# Patient Record
Sex: MALE | Race: WHITE
[De-identification: names, ages, dates, MRNs, and addresses within clinical notes are randomized per-mention and may not be internally consistent; named-entity substitution may affect disease eponyms.]

---

## 2017-05-14 ENCOUNTER — HOSPITAL ENCOUNTER (INPATIENT)
Dept: HOSPITAL 14 - H.ER | Age: 42
LOS: 2 days | Discharge: HOME | DRG: 918 | End: 2017-05-16
Attending: INTERNAL MEDICINE | Admitting: INTERNAL MEDICINE
Payer: MEDICAID

## 2017-05-14 VITALS — BODY MASS INDEX: 27.8 KG/M2

## 2017-05-14 DIAGNOSIS — K92.0: ICD-10-CM

## 2017-05-14 DIAGNOSIS — Z59.0: ICD-10-CM

## 2017-05-14 DIAGNOSIS — G62.9: ICD-10-CM
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
## 2017-05-14 DIAGNOSIS — F10.10: ICD-10-CM

## 2017-05-14 DIAGNOSIS — D72.828: ICD-10-CM

## 2017-05-14 DIAGNOSIS — K29.50: ICD-10-CM

## 2017-05-14 DIAGNOSIS — M54.5: ICD-10-CM

## 2017-05-14 DIAGNOSIS — F11.10: ICD-10-CM

## 2017-05-14 DIAGNOSIS — F32.9: ICD-10-CM

## 2017-05-14 DIAGNOSIS — F17.210: ICD-10-CM

## 2017-05-14 DIAGNOSIS — F12.90: ICD-10-CM

## 2017-05-14 DIAGNOSIS — T43.211A: Primary | ICD-10-CM

## 2017-05-14 DIAGNOSIS — F41.9: ICD-10-CM

## 2017-05-14 DIAGNOSIS — F14.10: ICD-10-CM

## 2017-05-14 DIAGNOSIS — K20.9: ICD-10-CM

## 2017-05-14 DIAGNOSIS — F16.10: ICD-10-CM

## 2017-05-14 DIAGNOSIS — G89.29: ICD-10-CM

## 2017-05-14 LAB
ALBUMIN/GLOB SERPL: 1.3 {RATIO} (ref 1–2.1)
ALP SERPL-CCNC: 87 U/L (ref 38–126)
ALT SERPL-CCNC: 58 U/L (ref 21–72)
AST SERPL-CCNC: 85 U/L (ref 17–59)
BASOPHILS # BLD AUTO: 0.1 K/UL (ref 0–0.2)
BASOPHILS NFR BLD: 1 % (ref 0–2)
BILIRUB SERPL-MCNC: 0.6 MG/DL (ref 0.2–1.3)
BUN SERPL-MCNC: 17 MG/DL (ref 9–20)
CALCIUM SERPL-MCNC: 9 MG/DL (ref 8.4–10.2)
CHLORIDE SERPL-SCNC: 105 MMOL/L (ref 98–107)
CO2 SERPL-SCNC: 25 MMOL/L (ref 22–30)
EOSINOPHIL # BLD AUTO: 0.1 K/UL (ref 0–0.7)
EOSINOPHIL NFR BLD: 1.1 % (ref 0–4)
ERYTHROCYTE [DISTWIDTH] IN BLOOD BY AUTOMATED COUNT: 14.2 % (ref 11.5–14.5)
ETHANOL SERPL-MCNC: < 10 MG/DL (ref 0–10)
GLOBULIN SER-MCNC: 3.3 GM/DL (ref 2.2–3.9)
GLUCOSE SERPL-MCNC: 83 MG/DL (ref 75–110)
HCT VFR BLD CALC: 40.3 % (ref 35–51)
LYMPHOCYTES # BLD AUTO: 3.5 K/UL (ref 1–4.3)
LYMPHOCYTES NFR BLD AUTO: 30.5 % (ref 20–40)
MAGNESIUM SERPL-MCNC: 2.1 MG/DL (ref 1.6–2.3)
MCH RBC QN AUTO: 28.1 PG (ref 27–31)
MCHC RBC AUTO-ENTMCNC: 32.6 G/DL (ref 33–37)
MCV RBC AUTO: 86.2 FL (ref 80–94)
MONOCYTES # BLD: 0.8 K/UL (ref 0–0.8)
MONOCYTES NFR BLD: 7.1 % (ref 0–10)
NEUTROPHILS # BLD: 6.9 K/UL (ref 1.8–7)
NEUTROPHILS NFR BLD AUTO: 60.3 % (ref 50–75)
NRBC BLD AUTO-RTO: 0.1 % (ref 0–0)
PLATELET # BLD: 352 K/UL (ref 130–400)
PMV BLD AUTO: 8.3 FL (ref 7.2–11.7)
POTASSIUM SERPL-SCNC: 4 MMOL/L (ref 3.6–5)
PROT SERPL-MCNC: 7.7 G/DL (ref 6.3–8.2)
SODIUM SERPL-SCNC: 140 MMOL/L (ref 132–148)
WBC # BLD AUTO: 11.4 K/UL (ref 4.8–10.8)

## 2017-05-14 SDOH — ECONOMIC STABILITY - HOUSING INSECURITY: HOMELESSNESS: Z59.0
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
## 2017-05-14 NOTE — ED PDOC
- Laboratory Results


Result Diagrams: 


 05/14/17 19:05





 05/14/17 19:05





- ECG


O2 Sat by Pulse Oximetry: 100





- Progress


ED Course And Treament: 





Signed out to me pending re-evaluation and sobriety.


CT head w/o contrast: negative.


CT cervical spine w/o contrast: no fx.


Pt. sleeping comfortably and easily arousable to tactile stimulation.





0015


Pt. woke up and began having bright red vomitus. 


Protonix 80mg IV, Zofran 4mg IV, IV NS bolus, labs ordered. EKG ordered.


Pt. reports no hx of GI bleed. Last BM was yesterday and was normal. Pt. admits 

to drinking alcohol daily. 


Denies melena, hematochezia, BRBPR. RECTAL: hemoccult negative


EKG: Sinus tach @ 102 bpm without ST-T wave changes, no QRS prolongation.  


Case d/w Dr. Lino who agrees with care. 


Case d/w Dr. Jasmine and arrangements made for admission. 





Disposition





- Clinical Impression


Clinical Impression: 


 Overdose, Syncope, GI bleed








- POA


Present On Arrival: None





- Disposition


Disposition: Routine/Home


Disposition Time: 01:00


Condition: FAIR

## 2017-05-15 LAB
APTT BLD: 28.7 SECONDS (ref 23.3–32.5)
BILIRUB UR-MCNC: NEGATIVE MG/DL
BUN SERPL-MCNC: 13 MG/DL (ref 9–20)
CALCIUM SERPL-MCNC: 8.1 MG/DL (ref 8.4–10.2)
CHLORIDE SERPL-SCNC: 106 MMOL/L (ref 98–107)
CO2 SERPL-SCNC: 27 MMOL/L (ref 22–30)
COLOR UR: YELLOW
ERYTHROCYTE [DISTWIDTH] IN BLOOD BY AUTOMATED COUNT: 14.5 % (ref 11.5–14.5)
GLUCOSE SERPL-MCNC: 94 MG/DL (ref 75–110)
GLUCOSE UR STRIP-MCNC: (no result) MG/DL
HCT VFR BLD CALC: 37.4 % (ref 35–51)
KETONES UR STRIP-MCNC: 20 MG/DL
LEUKOCYTE ESTERASE UR-ACNC: (no result) LEU/UL
MCH RBC QN AUTO: 27.8 PG (ref 27–31)
MCHC RBC AUTO-ENTMCNC: 32.1 G/DL (ref 33–37)
MCV RBC AUTO: 86.7 FL (ref 80–94)
PH UR STRIP: 6 [PH] (ref 5–8)
PLATELET # BLD: 318 K/UL (ref 130–400)
POTASSIUM SERPL-SCNC: 4 MMOL/L (ref 3.6–5)
PROT UR STRIP-MCNC: NEGATIVE MG/DL
RBC # UR STRIP: (no result) /UL
RBC #/AREA URNS HPF: 3 /HPF (ref 0–3)
SODIUM SERPL-SCNC: 140 MMOL/L (ref 132–148)
SP GR UR STRIP: 1.01 (ref 1–1.03)
UROBILINOGEN UR-MCNC: (no result) MG/DL (ref 0.2–1)
WBC # BLD AUTO: 9.2 K/UL (ref 4.8–10.8)
WBC #/AREA URNS HPF: 2 /HPF (ref 0–5)

## 2017-05-15 PROCEDURE — 0DB58ZX EXCISION OF ESOPHAGUS, VIA NATURAL OR ARTIFICIAL OPENING ENDOSCOPIC, DIAGNOSTIC: ICD-10-PCS | Performed by: INTERNAL MEDICINE

## 2017-05-15 RX ADMIN — BACITRACIN SCH APPLIC: 500 OINTMENT TOPICAL at 18:05

## 2017-05-15 NOTE — CON
DATE: 05/15/2017



REFERRING PHYSICIAN:  Dr. Mariusz Jasmine



REASON FOR CONSULTATION:  Hematemesis.



This is a 41-year-old man with history of mental status changes, is homeless male recently presented 
2 weeks ago, anxiety, depression, back problems, alcohol and drug abuse.  Essentially found unrespons
pi[place] station.  He basically had episode of bright red blood emesis, for which
 GI was called.  The patient is currently lying in bed, comfortable, no apparent distress.



PAST MEDICAL HISTORY:  As above.



PAST SURGICAL HISTORY:  As above.



MEDICATIONS:  Have been reviewed.



All other systems have been reviewed and negative apart from the HPI.



PHYSICAL EXAMINATION:

VITAL SIGNS:  Here in the hospital, _____.

GENERAL:  A pleasant middle-aged man, lying in bed, comfortable, in no apparent distress.

HEAD:  Normocephalic, atraumatic.

EYES:  Pupils equally reactive to light bilaterally.  No conjunctival pallor or icterus.

NECK:  Supple, normal range of motion.  No lymphadenopathy appreciated.

LUNGS:  Coarse breath sounds bilaterally.

HEART:  S1, S2.  Regular rate and rhythm.  No murmurs appreciated.

ABDOMEN:  Soft, nontender.  Bowel sounds present.  No rebound, no guarding.

RECTAL:  Deferred.

EXTREMITIES:  Pulses present bilaterally.

SKIN:  Warm, dry and intact.

NEUROLOGIC:  Alert and oriented x 3.



LABORATORIES:  Have been reviewed.  _____ down to 9.3, hemoglobin was 13.1, now 12.1, platelet count 
is normal.  INR is normal.  AST 85.



ASSESSMENT AND PLAN:  This is a 41-year-old man with hematemesis.  Plan for EGD.



Thank you for the consult.





__________________________________________

Álvaro Lino MD, PhD







cc:Mariusz Jasmine M.D.



DD: 05/15/2017 12:39:18  906

TT: 05/15/2017 14:10:34

Confirmation # [id number]

Dictation # [id number]

en
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
## 2017-05-15 NOTE — CP.PCM.PN
Subjective





- Date & Time of Evaluation


Date of Evaluation: 05/15/17


Time of Evaluation: 14:45





- Subjective


Subjective: 





Hospitalist Progress Note (Patient was seen and examined at 2:45 PM 5/15/17 402-

2)





41 year old male who was admitted earlier this morning after he was found 

unresponsive in the bathroom [place] a train station. UDS showed positive for Opiates

, Phenycyclidine, Benzodiazepine, and Cocaine. He also had one episode of 

bright red blood vomitus while in the ER. He was admitted to the Telemetry Unit 

for further evaluation.





ROS


NO more episodes of blood vomiting


NO other complaints other than swelling and pain in both feet that has been 

present for the past 3 weeks.


NO chest pain, NO palpitations, NO SOB/Cough/Wheezing, NO dysphagia/odynophagia

, NO abdominal pain, NO d/c, NO burning/pain with urination, NO headache, NO 

new changes in vision/eye pain, NO new changes in hearing/ear pain, NO 

paresthesias, NO lightheadedness/dizziness





Exam:


HEENT: NCA, EOMI, PERRLA, NO cervical/supraclavicular/submandibular 

lymphadenopathy, NO pharyngeal erythema/exudate, Oral Mucosa and Nasal 

Turbinates are moist


Cardio: NS1 and NS2, NO M/R/G


Resp: CTA B/L, NO R/R/W


GI: BSx4, Soft, NT, Central Obesity, Liver and Spleen could not be adequately 

palpated, NO guarding/rebound tenderness


Ext: Non-pitting edema bilateral feet to superior to the ankles, non tender to 

palpation, blister present on right foot pedal surface and small sore (without 

cellulitis) on left foot dorsal surface near great toe


Neuro: CN II through XII are grossly intact





Assessment and Plan:





1). Polysubstance Abuse


Psychiatry and  Consult ordered: history of being detained in 

Immigration detention in [place], [place] for the past 2 years where he was awaiting 

assylum. Family is in [place]. States that he was released from immigration 3 

weeks ago without any assistance and therefore became homeless. Feet hurt 

secondary to walking long distances. Took multiple drugs (see UDS) as he states 

that someone told him it would help with his feet pain. NO thoughts of hurting 

himself or others





2). Upper GI Bleed (Episode of Blood Vomitus)


S/P EGD today by GI Dr. Lino showing Esophagitis, Gastritis: F/U Biopsy 

reports


Zofran 4mg IV Q24H PRN N/V


Protonix 40 mg IV 1x/day





3). Hx of Alcohol Use


Ativan 1 mg IV Q6H PRN Agitation


Banana Bag @ 100 ml per hour





4). Leukocytosis


This is likely reactive


F/U CBC





5). Hx Chronic Back Pain


Monitor for now





6). Prophylactic Measure


Protonix 40 mg IV 1x/day


NO anticoagulation for now considering the episode of blood vomitus


Venous Duplex of B/L LE ordered secondary to edema noted on exam and if 

negative then B/L SCDs


Bacitracin topical TID to the effected areas on the bilateral feet








Objective





- Vital Signs/Intake and Output


Vital Signs (last 24 hours): 


 











Temp Pulse Resp BP Pulse Ox


 


 97.8 F   88   20   100/56 L  96 


 


 05/15/17 13:02  05/15/17 13:02  05/15/17 13:02  05/15/17 13:02  05/15/17 13:02








Intake and Output: 


 











 05/15/17 05/15/17





 06:59 18:59


 


Intake Total  100


 


Balance  100














- Medications


Medications: 


 Current Medications





Lorazepam (Ativan)  1 mg IVP Q6 PRN


   PRN Reason: Agitation


Ondansetron HCl (Zofran Inj)  4 mg IVP Q4 PRN


   PRN Reason: Nausea/Vomiting


Pantoprazole Sodium (Protonix Inj)  40 mg IVP DAILY CHANTELLE


   Last Admin: 05/15/17 08:53 Dose:  40 mg











- Labs


Labs: 


 





 05/15/17 07:15 





 05/15/17 07:15 





 











PT  11.6 SECONDS (9.6-11.2)  H  05/15/17  00:32    


 


INR  1.12  (0.92-1.08)  H  05/15/17  00:32    


 


APTT  28.7 SECONDS (23.3-32.5)   05/15/17  00:32

## 2017-05-15 NOTE — CT
PROCEDURE:  CT HEAD WITHOUT CONTRAST.



HISTORY:

AMS



COMPARISON:

None available. 



TECHNIQUE:

Axial computed tomography images were obtained through the head/brain 

without intravenous contrast.  



Radiation dose:



Total exam DLP = 875 mGy-cm.



This CT exam was performed using one or more of the following dose 

reduction techniques: Automated exposure control, adjustment of the 

mA and/or kV according to patient size, and/or use of iterative 

reconstruction technique.



FINDINGS:



HEMORRHAGE:

No intracranial hemorrhage. 



BRAIN:

No mass effect or edema.  No atrophy or chronic microvascular 

ischemic changes.



VENTRICLES:

Unremarkable. No hydrocephalus. 



CALVARIUM:

Unremarkable.



PARANASAL SINUSES:

Unremarkable as visualized. No significant inflammatory changes.



MASTOID AIR CELLS:

Unremarkable as visualized. No inflammatory changes.



OTHER FINDINGS:

None.



IMPRESSION:

Normal CT of the Head.

## 2017-05-15 NOTE — RAD
HISTORY:

hematemesis  



COMPARISON:

05/14/2017. 



FINDINGS:



LUNGS:

No active pulmonary disease.



PLEURA:

No significant pleural effusion identified, no pneumothorax apparent.



CARDIOVASCULAR:

Normal.



OSSEOUS STRUCTURES:

No significant abnormalities. Multiple old healed rib fractures left 

jm thorax. 



VISUALIZED UPPER ABDOMEN:

Normal.



OTHER FINDINGS:

None.



IMPRESSION:

No active disease. No significant interval change compared to the 

prior examination(s).

## 2017-05-15 NOTE — RAD
HISTORY:

Syncope.



COMPARISON:

No prior. 



FINDINGS:



LUNGS:

No active pulmonary disease.



PLEURA:

No significant pleural effusion identified, no pneumothorax apparent.



CARDIOVASCULAR:

 No radiographic findings to suggest acute or significant 

cardiovascular disease.



OSSEOUS STRUCTURES:

No significant abnormalities.



VISUALIZED UPPER ABDOMEN:

Normal.



OTHER FINDINGS:

None.



IMPRESSION:

No active disease.

## 2017-05-15 NOTE — CT
PROCEDURE:  CT Cervical Spine without contrast



HISTORY:

<fall>



COMPARISON:

None available.



TECHNIQUE:

Axial computed tomography images were obtained of the cervical spine 

without the use of intravenous contrast. Coronal and sagittal 

reformatted images were created and reviewed.



Radiation dose: 



Total exam DLP = 633 mGy-cm.



This CT exam was performed using one or more of the following dose 

reduction techniques: Automated exposure control, adjustment of the 

mA and/or kV according to patient size, and/or use of iterative 

reconstruction technique.



FINDINGS:



VERTEBRAE:

No fracture. Normal alignment. No destructive bony lesion.



DISCS/SPINAL CANAL/NEURAL FORAMINA:

Multilevel disc space narrowing and spondylosis. 



PARASPINAL SOFT TISSUES:

Unremarkable. 



OTHER FINDINGS:

None.



IMPRESSION:

No acute fracture. Degenerative changes.

## 2017-05-16 VITALS
OXYGEN SATURATION: 99 % | SYSTOLIC BLOOD PRESSURE: 104 MMHG | DIASTOLIC BLOOD PRESSURE: 74 MMHG | TEMPERATURE: 98.5 F | HEART RATE: 81 BPM

## 2017-05-16 VITALS — RESPIRATION RATE: 18 BRPM

## 2017-05-16 LAB
BASOPHILS # BLD AUTO: 0.1 K/UL (ref 0–0.2)
BASOPHILS NFR BLD: 0.7 % (ref 0–2)
BUN SERPL-MCNC: 11 MG/DL (ref 9–20)
CALCIUM SERPL-MCNC: 8.7 MG/DL (ref 8.4–10.2)
CHLORIDE SERPL-SCNC: 106 MMOL/L (ref 98–107)
CO2 SERPL-SCNC: 27 MMOL/L (ref 22–30)
EOSINOPHIL # BLD AUTO: 0.2 K/UL (ref 0–0.7)
EOSINOPHIL NFR BLD: 2.4 % (ref 0–4)
ERYTHROCYTE [DISTWIDTH] IN BLOOD BY AUTOMATED COUNT: 14.4 % (ref 11.5–14.5)
GLUCOSE SERPL-MCNC: 88 MG/DL (ref 75–110)
HCT VFR BLD CALC: 38.6 % (ref 35–51)
LYMPHOCYTES # BLD AUTO: 2.8 K/UL (ref 1–4.3)
LYMPHOCYTES NFR BLD AUTO: 36.6 % (ref 20–40)
MCH RBC QN AUTO: 28.4 PG (ref 27–31)
MCHC RBC AUTO-ENTMCNC: 33.1 G/DL (ref 33–37)
MCV RBC AUTO: 85.7 FL (ref 80–94)
MONOCYTES # BLD: 0.7 K/UL (ref 0–0.8)
MONOCYTES NFR BLD: 9.7 % (ref 0–10)
NEUTROPHILS # BLD: 3.8 K/UL (ref 1.8–7)
NEUTROPHILS NFR BLD AUTO: 50.6 % (ref 50–75)
NRBC BLD AUTO-RTO: 0 % (ref 0–0)
PLATELET # BLD: 324 K/UL (ref 130–400)
PMV BLD AUTO: 8.7 FL (ref 7.2–11.7)
POTASSIUM SERPL-SCNC: 4.1 MMOL/L (ref 3.6–5)
SODIUM SERPL-SCNC: 142 MMOL/L (ref 132–148)
WBC # BLD AUTO: 7.6 K/UL (ref 4.8–10.8)

## 2017-05-16 RX ADMIN — BACITRACIN SCH APPLIC: 500 OINTMENT TOPICAL at 13:00

## 2017-05-16 RX ADMIN — BACITRACIN SCH APPLIC: 500 OINTMENT TOPICAL at 10:14

## 2017-05-16 NOTE — CARD
--------------- APPROVED REPORT --------------





EKG Measurement

Heart [id number]

VT [id number]

[id number]

[id number]

[id number]



<Conclusion>

Normal sinus rhythm

Normal ECG

## 2017-05-16 NOTE — CARD
--------------- APPROVED REPORT --------------





EKG Measurement

Heart [id number]

ID [id number]

[id number]

[id number]

[id number]



<Conclusion>

Sinus tachycardia

Otherwise normal ECG

## 2017-05-16 NOTE — US
PROCEDURE:  Bilateral lower extremity duplex venous sonography.



HISTORY:

Bilateral Lower Extremity Edema



COMPARISON:

None available. 



TECHNIQUE:

Bilateral common femoral, superficial femoral, popliteal and 

posterior tibial veins were evaluated. Flow was assessed with color 

Doppler, compressibility, assessment of phasic flow and augmentation 

response. 



FINDINGS:



COMMON FEMORAL VEIN:

Right CFV:  Unremarkable.



Left CFV:  Unremarkable.



SUPERFICIAL FEMORAL VEIN:

Right SFV: Unremarkable.



Left SFV:  Unremarkable.



POPLITEAL VEIN:

Right Popliteal:  Unremarkable.



Left Popliteal:  Unremarkable.



POSTERIOR TIBIAL VEIN:

Right PTV: Unremarkable.



Left PTV: Unremarkable.



OTHER FINDINGS:

Left popliteal fossa collection 2.4 x 2.4 by 2.3 cm.



IMPRESSION:

No evidence of deep venous thrombosis.



Additional benign and/or incidental findings described above.

## 2017-05-16 NOTE — CP.PCM.DIS
Provider





- Provider


Date of Admission: 


05/15/17 20:39





Attending physician: 


Mariusz Jasmine





Consults: 





GI : Dr Lino


Psych; Dr Willis


Time Spent in preparation of Discharge (in minutes): 35





Diagnosis





- Discharge Diagnosis


(1) GI bleed


Status: Acute   





(2) Polysubstance abuse


Status: Chronic   





(3) Foot pain


Status: Chronic   





(4) Depression


Status: Chronic   





Hospital Course





- Lab Results


Lab Results: 


 Most Recent Lab Values











WBC  7.6 K/uL (4.8-10.8)   05/16/17  05:10    


 


RBC  4.51 Mil/uL (4.40-5.90)   05/16/17  05:10    


 


Hgb  12.8 g/dL (12.0-18.0)   05/16/17  05:10    


 


Hct  38.6 % (35.0-51.0)   05/16/17  05:10    


 


MCV  85.7 fl (80.0-94.0)   05/16/17  05:10    


 


MCH  28.4 pg (27.0-31.0)   05/16/17  05:10    


 


MCHC  33.1 g/dL (33.0-37.0)   05/16/17  05:10    


 


RDW  14.4 % (11.5-14.5)   05/16/17  05:10    


 


Plt Count  324 K/uL (130-400)   05/16/17  05:10    


 


MPV  8.7 fl (7.2-11.7)   05/16/17  05:10    


 


Neut % (Auto)  50.6 % (50.0-75.0)   05/16/17  05:10    


 


Lymph % (Auto)  36.6 % (20.0-40.0)   05/16/17  05:10    


 


Mono % (Auto)  9.7 % (0.0-10.0)   05/16/17  05:10    


 


Eos % (Auto)  2.4 % (0.0-4.0)   05/16/17  05:10    


 


Baso % (Auto)  0.7 % (0.0-2.0)   05/16/17  05:10    


 


Neut #  3.8 K/uL (1.8-7.0)   05/16/17  05:10    


 


Lymph #  2.8 K/uL (1.0-4.3)   05/16/17  05:10    


 


Mono #  0.7 K/uL (0.0-0.8)   05/16/17  05:10    


 


Eos #  0.2 K/uL (0.0-0.7)   05/16/17  05:10    


 


Baso #  0.1 K/uL (0.0-0.2)   05/16/17  05:10    


 


PT  11.6 SECONDS (9.6-11.2)  H  05/15/17  00:32    


 


INR  1.12  (0.92-1.08)  H  05/15/17  00:32    


 


APTT  28.7 SECONDS (23.3-32.5)   05/15/17  00:32    


 


Sodium  142 mmol/l (132-148)   05/16/17  05:10    


 


Potassium  4.1 MMOL/L (3.6-5.0)   05/16/17  05:10    


 


Chloride  106 mmol/L ()   05/16/17  05:10    


 


Carbon Dioxide  27 mmol/L (22-30)   05/16/17  05:10    


 


Anion Gap  13  (10-20)   05/16/17  05:10    


 


BUN  11 mg/dl (9-20)   05/16/17  05:10    


 


Creatinine  0.8 mg/dL (0.8-1.5)   05/16/17  05:10    


 


Est GFR ( Amer)  > 60   05/16/17  05:10    


 


Est GFR (Non-Af Amer)  > 60   05/16/17  05:10    


 


POC Glucose (mg/dL)  95 mg/dL ()   05/14/17  18:46    


 


Random Glucose  88 mg/dL ()   05/16/17  05:10    


 


Calcium  8.7 mg/dL (8.4-10.2)   05/16/17  05:10    


 


Magnesium  2.1 MG/DL (1.6-2.3)   05/14/17  19:05    


 


Total Bilirubin  0.6 mg/dl (0.2-1.3)   05/14/17  19:05    


 


AST  85 U/L (17-59)  H D 05/14/17  19:05    


 


ALT  58 U/L (21-72)   05/14/17  19:05    


 


Alkaline Phosphatase  87 U/L ()   05/14/17  19:05    


 


Total Protein  7.7 G/DL (6.3-8.2)   05/14/17  19:05    


 


Albumin  4.4 g/dL (3.5-5.0)   05/14/17  19:05    


 


Globulin  3.3 gm/dL (2.2-3.9)   05/14/17  19:05    


 


Albumin/Globulin Ratio  1.3  (1.0-2.1)   05/14/17  19:05    


 


Urine Color  Yellow  (YELLOW)   05/15/17  00:36    


 


Urine Clarity  Clear  (Clear)   05/15/17  00:36    


 


Urine pH  6.0  (5.0-8.0)   05/15/17  00:36    


 


Ur Specific Gravity  1.015  (1.003-1.030)   05/15/17  00:36    


 


Urine Protein  Negative mg/dL (NEGATIVE)   05/15/17  00:36    


 


Urine Glucose (UA)  Neg mg/dL (Normal)   05/15/17  00:36    


 


Urine Ketones  20 mg/dL (NEGATIVE)   05/15/17  00:36    


 


Urine Blood  Small  (NEGATIVE)   05/15/17  00:36    


 


Urine Nitrate  Negative  (NEGATIVE)   05/15/17  00:36    


 


Urine Bilirubin  Negative  (NEGATIVE)   05/15/17  00:36    


 


Urine Urobilinogen  0.2-1.0 mg/dL (0.2-1.0)   05/15/17  00:36    


 


Ur Leukocyte Esterase  Neg Marva/uL (Negative)   05/15/17  00:36    


 


Urine RBC (Auto)  3 /hpf (0-3)   05/15/17  00:36    


 


Urine Microscopic WBC  2 /hpf (0-5)   05/15/17  00:36    


 


Salicylates  < 1.0 mg/dl  05/14/17  19:05    


 


Urine Opiates Screen  Positive  (NEGATIVE)  H  05/15/17  00:36    


 


Urine Methadone Screen  Negative  (NEGATIVE)   05/15/17  00:36    


 


Acetaminophen  < 10.0 ug/ml (10.0-30.0)  L  05/14/17  19:05    


 


Ur Barbiturates Screen  Negative  (NEGATIVE)   05/15/17  00:36    


 


Ur Phencyclidine Scrn  Positive  (NEGATIVE)  H  05/15/17  00:36    


 


Ur Amphetamines Screen  Negative  (NEGATIVE)   05/15/17  00:36    


 


U Benzodiazepines Scrn  Positive  (NEGATIVE)  H  05/15/17  00:36    


 


U Oth Cocaine Metabols  Positive  (NEGATIVE)  H  05/15/17  00:36    


 


U Cannabinoids Screen  Negative  (NEGATIVE)   05/15/17  00:36    


 


Alcohol, Quantitative  < 10 mg/dl (0-10)   05/14/17  19:05    


 


Blood Type  A POSITIVE   05/15/17  00:40    


 


Blood Type Confirm  A POSITIVE   05/15/17  07:15    


 


Antibody Screen  Negative   05/15/17  00:40    


 


BBK History Checked  No verified bt   05/15/17  00:40    














- Hospital Course


Hospital Course: 











41 years Homeless male released from [facility] 2 weeks prior  with hx of Anxiety, 

Depression, back problem, alcohol and drug abuse who was [place] unresponsive [place] 

[place].   He was brought to the ED for possible drug 

overdose.  In the ED ,  was awake and able to give a history, stating that he 

took 5 Effexor of 300mg each, 7 Neurontin 800mg each to relieve the pain at 

both feet.  These medications were from long-term for his painful back. He referred 

no thoughts of Suicide nor Homicide.   In the ED, he vomited once and vomitus 

had blood.   Admitted to Telemetry - started on IV Protonix.  GI consulted- 

underwent EGD w/c showed NO activve bleeding - noted was Esophagitis and 

Gastritis.








1). Polysubstance Abuse


Urine Tox : + for Cocaine, Benzo, PCP,Opiates


also took about 5 tabs Effexor and 8 tabs Neurontin


Counseled re drug abuse


SW consulted


Psych consulted 





2). Upper GI Bleed (Episode of Blood Vomitus)


S/P EGD today by GI Dr. Lino showing Esophagitis, Gastritis


F/U Biopsy report at the [facility]


Zofran 4mg IV Q24H PRN N/V


Protonix 40 mg IV 1x/day





3). Hx of Alcohol Use


no signs of withdrawal sxs, states that he only occasionally drinks


Ativan 1 mg IV Q6H PRN 


Banana Bag @ 100 ml per hour





4). Leukocytosis


This is likely reactive


rpt showed normal WBC, no signs of infection





5). Hx Foot Pain 


pt on his feet all day due to homelessness


Doppler US of LE negative








6). Prophylactic Measure


NO anticoagulation for now considering the episode of blood vomitus











Discharge Exam





- Head Exam


Head Exam: ATRAUMATIC, NORMAL INSPECTION, NORMOCEPHALIC





- Eye Exam


Eye Exam: EOMI, Normal appearance, PERRL


Pupil Exam: NORMAL ACCOMODATION





- ENT Exam


ENT Exam: Mucous Membranes Moist, Normal External Ear Exam





- Neck Exam


Neck exam: Full Rom





- Respiratory Exam


Respiratory Exam: NORMAL BREATHING PATTERN.  absent: Respiratory Distress





- Cardiovascular Exam


Cardiovascular Exam: REGULAR RHYTHM, +S1, +S2





- GI/Abdominal Exam


GI & Abdominal Exam: Normal Bowel Sounds, Soft.  absent: Tenderness





- Extremities Exam


Extremities exam: full ROM, normal capillary refill, pedal edema, pedal pulses 

present





- Back Exam


Back exam: FULL ROM.  absent: CVA tenderness (L), CVA tenderness (R)





- Neurological Exam


Neurological exam: Alert, CN II-XII Intact, Oriented x3, Reflexes Normal





- Psychiatric Exam


Psychiatric exam: Flat Affect





- Skin


Skin Exam: Dry, Normal Color, Warm





Discharge Plan





- Discharge Medications


Prescriptions: 


Omeprazole 40 mg PO DAILY #30 tablet.dr





- Follow Up Plan


Condition: GOOD


Disposition: HOME/ ROUTINE


Instructions:  Syncope (DC), Syncope (GEN)


Additional Instructions: 


appt FP clinic in 1 wk, ff up biopsy result in the clinic


Mental Health Clinic appt asap


 to d/c ETOH, drugs, smoking


Referrals: 


Count includes the [facility] [Outside]


[facility] [Outside]
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
## 2017-06-04 ENCOUNTER — HOSPITAL ENCOUNTER (EMERGENCY)
Dept: HOSPITAL 14 - H.ER | Age: 42
Discharge: HOME | End: 2017-06-04
Payer: COMMERCIAL

## 2017-06-04 VITALS
HEART RATE: 104 BPM | DIASTOLIC BLOOD PRESSURE: 75 MMHG | TEMPERATURE: 98 F | SYSTOLIC BLOOD PRESSURE: 135 MMHG | OXYGEN SATURATION: 98 % | RESPIRATION RATE: 18 BRPM

## 2017-06-04 VITALS — BODY MASS INDEX: 27.8 KG/M2

## 2017-06-04 DIAGNOSIS — F41.9: ICD-10-CM

## 2017-06-04 DIAGNOSIS — F17.200: ICD-10-CM

## 2017-06-04 DIAGNOSIS — R20.9: ICD-10-CM

## 2017-06-04 DIAGNOSIS — G56.30: Primary | ICD-10-CM

## 2017-06-04 DIAGNOSIS — F32.9: ICD-10-CM

## 2017-06-04 DIAGNOSIS — Z86.59: ICD-10-CM
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
## 2017-11-19 ENCOUNTER — HOSPITAL ENCOUNTER (EMERGENCY)
Dept: HOSPITAL 31 - C.ER | Age: 42
Discharge: HOME | End: 2017-11-19
Payer: COMMERCIAL

## 2017-11-19 VITALS
OXYGEN SATURATION: 100 % | RESPIRATION RATE: 18 BRPM | HEART RATE: 75 BPM | DIASTOLIC BLOOD PRESSURE: 61 MMHG | SYSTOLIC BLOOD PRESSURE: 110 MMHG

## 2017-11-19 VITALS — TEMPERATURE: 98.6 F

## 2017-11-19 VITALS — BODY MASS INDEX: 27.8 KG/M2

## 2017-11-19 DIAGNOSIS — T40.1X1A: Primary | ICD-10-CM

## 2017-11-19 LAB
BASOPHILS # BLD AUTO: 0.1 K/UL (ref 0–0.2)
BASOPHILS NFR BLD: 1 % (ref 0–2)
BUN SERPL-MCNC: 17 MG/DL (ref 9–20)
CALCIUM SERPL-MCNC: 8.2 MG/DL (ref 8.6–10.4)
CHLORIDE SERPL-SCNC: 99 MMOL/L (ref 98–107)
CO2 SERPL-SCNC: 27 MMOL/L (ref 22–30)
EOSINOPHIL # BLD AUTO: 0.3 K/UL (ref 0–0.7)
EOSINOPHIL NFR BLD: 3.7 % (ref 0–4)
ERYTHROCYTE [DISTWIDTH] IN BLOOD BY AUTOMATED COUNT: 14.8 % (ref 11.5–14.5)
GLUCOSE SERPL-MCNC: 69 MG/DL (ref 75–110)
HCT VFR BLD CALC: 40.1 % (ref 35–51)
LYMPHOCYTES # BLD AUTO: 3.3 K/UL (ref 1–4.3)
LYMPHOCYTES NFR BLD AUTO: 39.3 % (ref 20–40)
MCH RBC QN AUTO: 28.9 PG (ref 27–31)
MCHC RBC AUTO-ENTMCNC: 33.1 G/DL (ref 33–37)
MCV RBC AUTO: 87.3 FL (ref 80–94)
MONOCYTES # BLD: 0.6 K/UL (ref 0–0.8)
MONOCYTES NFR BLD: 7.3 % (ref 0–10)
NRBC BLD AUTO-RTO: 0.1 % (ref 0–2)
PLATELET # BLD: 337 K/UL (ref 130–400)
PMV BLD AUTO: 9.2 FL (ref 7.2–11.7)
POTASSIUM SERPL-SCNC: 3.7 MMOL/L (ref 3.6–5.2)
SODIUM SERPL-SCNC: 135 MMOL/L (ref 132–148)
WBC # BLD AUTO: 8.3 K/UL (ref 4.8–10.8)

## 2017-11-19 PROCEDURE — 99285 EMERGENCY DEPT VISIT HI MDM: CPT

## 2017-11-19 PROCEDURE — 82948 REAGENT STRIP/BLOOD GLUCOSE: CPT

## 2017-11-19 PROCEDURE — 84484 ASSAY OF TROPONIN QUANT: CPT

## 2017-11-19 PROCEDURE — 96375 TX/PRO/DX INJ NEW DRUG ADDON: CPT

## 2017-11-19 PROCEDURE — 96376 TX/PRO/DX INJ SAME DRUG ADON: CPT

## 2017-11-19 PROCEDURE — 80048 BASIC METABOLIC PNL TOTAL CA: CPT

## 2017-11-19 PROCEDURE — 96361 HYDRATE IV INFUSION ADD-ON: CPT

## 2017-11-19 PROCEDURE — 93005 ELECTROCARDIOGRAM TRACING: CPT

## 2017-11-19 PROCEDURE — 96374 THER/PROPH/DIAG INJ IV PUSH: CPT

## 2017-11-19 PROCEDURE — 85025 COMPLETE CBC W/AUTO DIFF WBC: CPT
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
## 2017-11-23 NOTE — CARD
--------------- APPROVED REPORT --------------





EKG Measurement

Heart [id number]

LA [id number]

[id number]

[id number]

[id number]



<Conclusion>

Normal sinus rhythm

Normal ECG

## 2018-05-16 ENCOUNTER — HOSPITAL ENCOUNTER (EMERGENCY)
Dept: HOSPITAL 14 - H.ER | Age: 43
Discharge: HOME | End: 2018-05-16
Payer: SELF-PAY

## 2018-05-16 VITALS
TEMPERATURE: 98.2 F | SYSTOLIC BLOOD PRESSURE: 137 MMHG | OXYGEN SATURATION: 99 % | RESPIRATION RATE: 16 BRPM | HEART RATE: 93 BPM | DIASTOLIC BLOOD PRESSURE: 78 MMHG

## 2018-05-16 VITALS — BODY MASS INDEX: 27.8 KG/M2
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
## 2018-05-16 DIAGNOSIS — F17.200: ICD-10-CM

## 2018-05-16 DIAGNOSIS — Z86.59: ICD-10-CM

## 2018-05-16 DIAGNOSIS — F32.9: ICD-10-CM

## 2018-05-16 DIAGNOSIS — S62.306A: Primary | ICD-10-CM
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
## 2018-05-17 NOTE — RAD
PROCEDURE:  Right Hand Radiographs.



HISTORY:

s/p reduction



COMPARISON:

5/16/2018 8:14 p.m.



FINDINGS:



BONES:

Status post close reduction comminuted distal 5th metacarpal 

diaphysis fracture.  There is displacement and angulation at the 

fracture site. There is no additional fracture identified.  Bony 

detail is partially obscured by overlying fiberglass splint material. 



JOINTS:

Normal. No osteoarthritic changes. 



SOFT TISSUES:

Normal. 



OTHER FINDINGS:

None.



IMPRESSION:

Displaced, angulated comminuted distal 5th metacarpal diaphysis 

fracture.

## 2018-05-17 NOTE — RAD
PROCEDURE:  Right Hand Radiographs.



HISTORY:

S/P REDUCTION



COMPARISON:

5/16/2018 at 7:10 p.m.



FINDINGS:



BONES:

Comminuted fracture distal 5th metacarpal diaphysis with mild 

angulation and some overriding. .  No other fracture identified.  

Bony detail partially obscured by overlying fiberglass splint. 



JOINTS:

Normal. No osteoarthritic changes. 



SOFT TISSUES:

Normal. 



OTHER FINDINGS:

None.



IMPRESSION:

Comminuted distal 5th metacarpal diaphysis fracture.

## 2018-05-17 NOTE — RAD
PROCEDURE:  Right Hand Radiographs.



HISTORY:

trauma



COMPARISON:

None.



FINDINGS:



BONES:

Comminuted fracture distal aspect right 5th metacarpal with 

considerable angulation. Fractures non articular.  The metacarpal 

phalangeal joint is preserved. 



JOINTS:

Normal. No osteoarthritic changes. 



SOFT TISSUES:

Soft tissue swelling attests to the acuity of the fracture. 



OTHER FINDINGS:

None.



IMPRESSION:

Acute fracture distal aspect right 5th metacarpal.